# Patient Record
Sex: FEMALE | Race: WHITE | Employment: UNEMPLOYED | URBAN - METROPOLITAN AREA
[De-identification: names, ages, dates, MRNs, and addresses within clinical notes are randomized per-mention and may not be internally consistent; named-entity substitution may affect disease eponyms.]

---

## 2017-08-12 PROBLEM — L03.115 CELLULITIS OF RIGHT LEG WITHOUT FOOT: Status: ACTIVE | Noted: 2017-08-12

## 2017-08-12 PROBLEM — E87.1 HYPONATREMIA: Status: ACTIVE | Noted: 2017-08-12

## 2017-08-12 PROBLEM — B18.2 HEP C W/O COMA, CHRONIC (HCC): Status: ACTIVE | Noted: 2017-08-12

## 2017-08-12 PROBLEM — F11.20 HEROIN DEPENDENCE (HCC): Status: ACTIVE | Noted: 2017-08-12

## 2017-08-12 PROBLEM — F14.10 COCAINE ABUSE (HCC): Status: ACTIVE | Noted: 2017-08-12

## 2017-08-12 PROBLEM — E87.20 LACTIC ACIDOSIS: Status: ACTIVE | Noted: 2017-08-12

## 2017-08-12 PROBLEM — F19.10 POLYSUBSTANCE ABUSE (HCC): Status: ACTIVE | Noted: 2017-08-12

## 2017-08-12 PROBLEM — M00.9 SEPTIC ARTHRITIS OF KNEE, RIGHT (HCC): Status: ACTIVE | Noted: 2017-08-12

## 2017-08-15 PROBLEM — R59.1 LYMPHADENOPATHY: Status: ACTIVE | Noted: 2017-08-15

## 2017-08-15 PROBLEM — M00.211: Status: ACTIVE | Noted: 2017-08-15

## 2017-08-27 PROBLEM — R50.9 FEBRILE: Status: ACTIVE | Noted: 2017-08-27

## 2017-08-27 PROBLEM — M25.511 RIGHT SHOULDER PAIN: Status: ACTIVE | Noted: 2017-08-27

## 2017-08-27 PROBLEM — F17.200 TOBACCO USE DISORDER: Status: ACTIVE | Noted: 2017-08-27

## 2017-08-27 PROBLEM — L89.301 DECUBITUS ULCER OF BUTTOCK, STAGE 1: Status: ACTIVE | Noted: 2017-08-27

## 2017-08-27 PROBLEM — D64.9 ANEMIA: Status: ACTIVE | Noted: 2017-08-27

## 2017-08-27 PROBLEM — M25.561 RIGHT KNEE PAIN: Status: ACTIVE | Noted: 2017-08-27

## 2017-12-03 PROBLEM — R45.851 SUICIDAL IDEATIONS: Status: ACTIVE | Noted: 2017-12-03

## 2017-12-03 PROBLEM — F11.20 HEROIN DEPENDENCE (HCC): Chronic | Status: ACTIVE | Noted: 2017-12-03

## 2017-12-03 PROBLEM — R45.851 SUICIDAL IDEATION: Status: ACTIVE | Noted: 2017-12-03

## 2017-12-03 PROBLEM — R45.851 SUICIDAL IDEATIONS: Chronic | Status: ACTIVE | Noted: 2017-12-03

## 2017-12-03 PROBLEM — F39 MOOD DISORDER (HCC): Chronic | Status: ACTIVE | Noted: 2017-12-03

## 2017-12-03 PROBLEM — I10 ESSENTIAL HYPERTENSION: Chronic | Status: ACTIVE | Noted: 2017-12-03

## 2017-12-03 PROBLEM — F41.1 GENERALIZED ANXIETY DISORDER: Chronic | Status: ACTIVE | Noted: 2017-12-03

## 2017-12-03 PROBLEM — F19.10 POLYSUBSTANCE ABUSE (HCC): Chronic | Status: ACTIVE | Noted: 2017-12-03

## 2017-12-03 PROBLEM — F19.90 IVDU (INTRAVENOUS DRUG USER): Chronic | Status: ACTIVE | Noted: 2017-12-03

## 2017-12-03 PROBLEM — J45.20 MILD INTERMITTENT ASTHMA WITHOUT COMPLICATION: Chronic | Status: ACTIVE | Noted: 2017-12-03

## 2017-12-03 PROBLEM — F14.10 COCAINE ABUSE (HCC): Chronic | Status: ACTIVE | Noted: 2017-12-03

## 2017-12-03 PROBLEM — F32.A DEPRESSION: Status: ACTIVE | Noted: 2017-12-03

## 2017-12-03 PROBLEM — I33.0 CHRONIC BACTERIAL ENDOCARDITIS: Chronic | Status: ACTIVE | Noted: 2017-12-03

## 2017-12-03 PROBLEM — F43.12 CHRONIC POST-TRAUMATIC STRESS DISORDER (PTSD): Chronic | Status: ACTIVE | Noted: 2017-12-03

## 2017-12-03 PROBLEM — F39 MOOD DISORDER (HCC): Status: ACTIVE | Noted: 2017-12-03

## 2017-12-03 PROBLEM — B18.2 HEP C W/O COMA, CHRONIC (HCC): Chronic | Status: ACTIVE | Noted: 2017-12-03

## 2017-12-03 PROBLEM — F32.A DEPRESSION: Chronic | Status: ACTIVE | Noted: 2017-12-03

## 2017-12-03 PROBLEM — M25.561 RIGHT KNEE PAIN: Chronic | Status: ACTIVE | Noted: 2017-08-27

## 2017-12-03 PROBLEM — F19.20 POLYSUBSTANCE DEPENDENCE (HCC): Chronic | Status: ACTIVE | Noted: 2017-12-03

## 2017-12-03 PROBLEM — F60.3 BORDERLINE PERSONALITY DISORDER IN ADULT (HCC): Chronic | Status: ACTIVE | Noted: 2017-12-03

## 2018-09-23 PROBLEM — F60.3 BORDERLINE PERSONALITY DISORDER IN ADULT (HCC): Chronic | Status: RESOLVED | Noted: 2017-12-03 | Resolved: 2018-09-23

## 2018-09-23 PROBLEM — R50.9 FEBRILE: Status: RESOLVED | Noted: 2017-08-27 | Resolved: 2018-09-23

## 2018-09-23 PROBLEM — R59.1 LYMPHADENOPATHY: Status: RESOLVED | Noted: 2017-08-15 | Resolved: 2018-09-23

## 2018-09-23 PROBLEM — D64.9 NORMOCYTIC ANEMIA: Status: RESOLVED | Noted: 2017-08-27 | Resolved: 2018-09-23

## 2018-09-23 PROBLEM — D64.9 ANEMIA: Status: RESOLVED | Noted: 2017-08-27 | Resolved: 2018-09-23

## 2018-09-23 PROBLEM — M25.561 RIGHT KNEE PAIN: Chronic | Status: RESOLVED | Noted: 2017-08-27 | Resolved: 2018-09-23

## 2018-09-23 PROBLEM — L89.301 DECUBITUS ULCER OF BUTTOCK, STAGE 1: Status: RESOLVED | Noted: 2017-08-27 | Resolved: 2018-09-23

## 2018-09-23 PROBLEM — R45.851 SUICIDAL IDEATION: Status: RESOLVED | Noted: 2017-12-03 | Resolved: 2018-09-23

## 2018-09-23 PROBLEM — R45.851 SUICIDAL IDEATIONS: Chronic | Status: RESOLVED | Noted: 2017-12-03 | Resolved: 2018-09-23

## 2018-09-23 PROBLEM — M00.9 SEPTIC JOINT (HCC): Status: ACTIVE | Noted: 2018-09-23

## 2018-09-23 PROBLEM — M00.211: Status: RESOLVED | Noted: 2017-08-15 | Resolved: 2018-09-23

## 2018-09-23 PROBLEM — L03.115 CELLULITIS OF RIGHT LEG WITHOUT FOOT: Status: RESOLVED | Noted: 2017-08-12 | Resolved: 2018-09-23

## 2018-09-23 PROBLEM — I33.0 CHRONIC BACTERIAL ENDOCARDITIS: Chronic | Status: RESOLVED | Noted: 2017-12-03 | Resolved: 2018-09-23

## 2018-09-23 PROBLEM — E87.20 LACTIC ACIDOSIS: Status: RESOLVED | Noted: 2017-08-12 | Resolved: 2018-09-23

## 2018-09-23 PROBLEM — F39 MOOD DISORDER (HCC): Chronic | Status: RESOLVED | Noted: 2017-12-03 | Resolved: 2018-09-23

## 2018-09-23 PROBLEM — M00.9 PYOGENIC ARTHRITIS OF RIGHT KNEE JOINT (HCC): Status: RESOLVED | Noted: 2017-08-12 | Resolved: 2018-09-23

## 2018-09-23 PROBLEM — M00.9 SEPTIC JOINT OF LEFT KNEE JOINT (HCC): Status: ACTIVE | Noted: 2018-09-23

## 2018-09-23 PROBLEM — M25.511 RIGHT SHOULDER PAIN: Status: RESOLVED | Noted: 2017-08-27 | Resolved: 2018-09-23

## 2018-11-15 ENCOUNTER — HOSPITAL ENCOUNTER (EMERGENCY)
Age: 43
Discharge: HOME | End: 2018-11-15
Attending: EMERGENCY MEDICINE
Payer: SELF-PAY

## 2018-11-15 VITALS
OXYGEN SATURATION: 98 % | HEART RATE: 72 BPM | SYSTOLIC BLOOD PRESSURE: 86 MMHG | TEMPERATURE: 98.6 F | DIASTOLIC BLOOD PRESSURE: 61 MMHG | RESPIRATION RATE: 20 BRPM

## 2018-11-15 DIAGNOSIS — F32.89 OTHER DEPRESSION: Primary | ICD-10-CM

## 2018-11-15 LAB
ALBUMIN SERPL-MCNC: 3.6 G/DL (ref 3.5–5)
ALBUMIN/GLOB SERPL: 0.8 {RATIO} (ref 1.1–2.2)
ALP SERPL-CCNC: 96 U/L (ref 45–117)
ALT SERPL-CCNC: 216 U/L (ref 12–78)
AMPHET UR QL SCN: NEGATIVE
ANION GAP SERPL CALC-SCNC: 7 MMOL/L (ref 5–15)
APAP SERPL-MCNC: <2 UG/ML (ref 10–30)
APPEARANCE UR: ABNORMAL
AST SERPL-CCNC: 122 U/L (ref 15–37)
BACTERIA URNS QL MICRO: NEGATIVE /HPF
BARBITURATES UR QL SCN: NEGATIVE
BASOPHILS # BLD: 0.1 K/UL (ref 0–0.1)
BASOPHILS NFR BLD: 0 % (ref 0–1)
BENZODIAZ UR QL: NEGATIVE
BILIRUB SERPL-MCNC: 0.4 MG/DL (ref 0.2–1)
BILIRUB UR QL: NEGATIVE
BUN SERPL-MCNC: 14 MG/DL (ref 6–20)
BUN/CREAT SERPL: 23 (ref 12–20)
CALCIUM SERPL-MCNC: 9.3 MG/DL (ref 8.5–10.1)
CANNABINOIDS UR QL SCN: NEGATIVE
CHLORIDE SERPL-SCNC: 106 MMOL/L (ref 97–108)
CO2 SERPL-SCNC: 25 MMOL/L (ref 21–32)
COCAINE UR QL SCN: NEGATIVE
COLOR UR: ABNORMAL
CREAT SERPL-MCNC: 0.62 MG/DL (ref 0.55–1.02)
DIFFERENTIAL METHOD BLD: ABNORMAL
DRUG SCRN COMMENT,DRGCM: ABNORMAL
EOSINOPHIL # BLD: 0.2 K/UL (ref 0–0.4)
EOSINOPHIL NFR BLD: 1 % (ref 0–7)
EPITH CASTS URNS QL MICRO: ABNORMAL /LPF
ERYTHROCYTE [DISTWIDTH] IN BLOOD BY AUTOMATED COUNT: 12.4 % (ref 11.5–14.5)
ETHANOL SERPL-MCNC: <10 MG/DL
GLOBULIN SER CALC-MCNC: 4.3 G/DL (ref 2–4)
GLUCOSE SERPL-MCNC: 104 MG/DL (ref 65–100)
GLUCOSE UR STRIP.AUTO-MCNC: NEGATIVE MG/DL
HCT VFR BLD AUTO: 41.1 % (ref 35–47)
HGB BLD-MCNC: 13.6 G/DL (ref 11.5–16)
HGB UR QL STRIP: NEGATIVE
IMM GRANULOCYTES # BLD: 0.1 K/UL (ref 0–0.04)
IMM GRANULOCYTES NFR BLD AUTO: 1 % (ref 0–0.5)
KETONES UR QL STRIP.AUTO: NEGATIVE MG/DL
LEUKOCYTE ESTERASE UR QL STRIP.AUTO: ABNORMAL
LYMPHOCYTES # BLD: 2.4 K/UL (ref 0.8–3.5)
LYMPHOCYTES NFR BLD: 22 % (ref 12–49)
MCH RBC QN AUTO: 30 PG (ref 26–34)
MCHC RBC AUTO-ENTMCNC: 33.1 G/DL (ref 30–36.5)
MCV RBC AUTO: 90.5 FL (ref 80–99)
METHADONE UR QL: POSITIVE
MONOCYTES # BLD: 0.4 K/UL (ref 0–1)
MONOCYTES NFR BLD: 4 % (ref 5–13)
NEUTS SEG # BLD: 8.1 K/UL (ref 1.8–8)
NEUTS SEG NFR BLD: 72 % (ref 32–75)
NITRITE UR QL STRIP.AUTO: NEGATIVE
NRBC # BLD: 0 K/UL (ref 0–0.01)
NRBC BLD-RTO: 0 PER 100 WBC
OPIATES UR QL: NEGATIVE
PCP UR QL: NEGATIVE
PH UR STRIP: 6.5 [PH] (ref 5–8)
PLATELET # BLD AUTO: 182 K/UL (ref 150–400)
PMV BLD AUTO: 10.4 FL (ref 8.9–12.9)
POTASSIUM SERPL-SCNC: 4 MMOL/L (ref 3.5–5.1)
PROT SERPL-MCNC: 7.9 G/DL (ref 6.4–8.2)
PROT UR STRIP-MCNC: NEGATIVE MG/DL
RBC # BLD AUTO: 4.54 M/UL (ref 3.8–5.2)
RBC #/AREA URNS HPF: ABNORMAL /HPF (ref 0–5)
SALICYLATES SERPL-MCNC: 3.2 MG/DL (ref 2.8–20)
SODIUM SERPL-SCNC: 138 MMOL/L (ref 136–145)
SP GR UR REFRACTOMETRY: 1.03 (ref 1–1.03)
UR CULT HOLD, URHOLD: NORMAL
UROBILINOGEN UR QL STRIP.AUTO: 1 EU/DL (ref 0.2–1)
WBC # BLD AUTO: 11.2 K/UL (ref 3.6–11)
WBC URNS QL MICRO: ABNORMAL /HPF (ref 0–4)

## 2018-11-15 PROCEDURE — 81001 URINALYSIS AUTO W/SCOPE: CPT

## 2018-11-15 PROCEDURE — 99284 EMERGENCY DEPT VISIT MOD MDM: CPT

## 2018-11-15 PROCEDURE — 85025 COMPLETE CBC W/AUTO DIFF WBC: CPT

## 2018-11-15 PROCEDURE — 36415 COLL VENOUS BLD VENIPUNCTURE: CPT

## 2018-11-15 PROCEDURE — 80307 DRUG TEST PRSMV CHEM ANLYZR: CPT

## 2018-11-15 PROCEDURE — 80053 COMPREHEN METABOLIC PANEL: CPT

## 2018-11-15 PROCEDURE — 90791 PSYCH DIAGNOSTIC EVALUATION: CPT

## 2018-11-15 NOTE — ED PROVIDER NOTES
44 y.o. female with past medical history significant for substance abuse, HTN, PCOS, pituitary tumor, bipolar disorder, PTSD, anxiety, and septic joint who presents from home via private vehicle with chief complaint of mental health problem. Pt reports a h/o bipolar, depression, anxiety, and PTSD, along with a h/o heroin abuse and is on methadone. Pt states she was in a nursing home after an admission recently for a septic joint. The patient's cousin states she was given 2 weeks worth of her medications, including methadone. Cousin states during this time, they have been trying to get the patient the correct documents to be set up at a methadone clinic here. The patient has been out of these medications for the last 4 days, and has been experiencing decreased appetite and generalized body aches. The patient states her ID will not come in the mail for another 7-10 days. Pt states she is worried about harming herself to \"take the easy way out\" if she does not get help. Pt reports a h/o suicidal attempts. Pt reports a h/o hepatitis C. There are no other acute medical concerns at this time. Old Chart Review: The patient was admitted to behavioral health in December 2017 for depression and an accidental drug overdose. Pt was seen again in December for an NSTEMI. Pt was admitted in September 2018 for right knee septic arthritis. Social hx: Current smoker; Current EtOH use; H/O heroin and cocaine abuse Note written by Tash Fowler, as dictated by Bonifacio Lake MD 12:58 PM 
 
 
The history is provided by the patient and medical records. No  was used. Past Medical History:  
Diagnosis Date  Anxiety disorder  Arthritis, septic, knee ([facility] 08/2017  Bipolar disorder ([facility]  Cancer ([facility] 06/2017 polycystic ovarian cancer  Depression  Diabetes ([facility] Resolved after weight loss. Last a1c 4.8 in 2016  Hypertension  Ill-defined condition cerebellum and pituitary tumor  Liver disease Hepatitis C  
 Pituitary tumor  Polycystic ovarian syndrome  Psychiatric disorder   
 depression  Psychiatric disorder   
 bipolar  Psychiatric disorder PTSD  Psychiatric disorder   
 anxiety  Psychiatric disorder  Substance abuse ([facility]  Suicidal thoughts Past Surgical History:  
Procedure Laterality Date  HX DILATION AND CURETTAGE    
 HX TONSILLECTOMY Family History:  
Family history unknown: Yes  
 
 
Social History Socioeconomic History  Marital status: SINGLE Spouse name: Not on file  Number of children: Not on file  Years of education: Not on file  Highest education level: Not on file Social Needs  Financial resource strain: Not on file  Food insecurity - worry: Not on file  Food insecurity - inability: Not on file  Transportation needs - medical: Not on file  Transportation needs - non-medical: Not on file Occupational History  Not on file Tobacco Use  Smoking status: Current Every Day Smoker Packs/day: 0.25 Years: 20.00 Pack years: 5.00 Types: Cigarettes  Smokeless tobacco: Current User Substance and Sexual Activity  Alcohol use: Yes Alcohol/week: 0.0 oz  
  Comment: last drink in July 2017  Drug use: Yes Types: Heroin, Cocaine Comment: last use today  Sexual activity: Yes  
  Partners: Male Birth control/protection: None Other Topics Concern  Not on file Social History Narrative  Not on file ALLERGIES: Penicillins and Bee sting [sting, bee] Review of Systems Constitutional: Positive for appetite change. Negative for activity change and fatigue. HENT: Negative for ear pain, facial swelling, sore throat and trouble swallowing. Eyes: Negative for pain, discharge and visual disturbance. Respiratory: Negative for chest tightness, shortness of breath and wheezing. Cardiovascular: Negative for chest pain and palpitations. Gastrointestinal: Negative for abdominal pain, blood in stool, nausea and vomiting. Genitourinary: Negative for difficulty urinating, flank pain and hematuria. Musculoskeletal: Positive for myalgias. Negative for arthralgias, joint swelling and neck pain. Skin: Negative for color change and rash. Neurological: Negative for dizziness, weakness, numbness and headaches. Hematological: Negative for adenopathy. Does not bruise/bleed easily. Psychiatric/Behavioral: Positive for dysphoric mood and suicidal ideas. Negative for behavioral problems, confusion and sleep disturbance. All other systems reviewed and are negative. Vitals:  
 11/15/18 1140 Pulse: (!) 106 SpO2: 100% Physical Exam  
Constitutional: She is oriented to person, place, and time. She appears well-developed and well-nourished. No distress. Has a generalized twitch. HENT:  
Head: Normocephalic and atraumatic. Nose: Nose normal.  
Mouth/Throat: Oropharynx is clear and moist.  
Eyes: Conjunctivae and EOM are normal. Pupils are equal, round, and reactive to light. No scleral icterus. Neck: Normal range of motion. Neck supple. No JVD present. No tracheal deviation present. No thyromegaly present. No carotid bruits noted. Cardiovascular: Normal rate, regular rhythm, normal heart sounds and intact distal pulses. Exam reveals no gallop and no friction rub. No murmur heard. Pulmonary/Chest: Effort normal and breath sounds normal. No respiratory distress. She has no wheezes. She has no rales. She exhibits no tenderness. Abdominal: Soft. Bowel sounds are normal. She exhibits no distension and no mass. There is no tenderness. There is no rebound and no guarding. Musculoskeletal: Normal range of motion. She exhibits no edema or tenderness. Lymphadenopathy:  
  She has no cervical adenopathy. Neurological: She is alert and oriented to person, place, and time. She has normal reflexes. No cranial nerve deficit. Coordination normal.  
Skin: Skin is warm and dry. No rash noted. No erythema. Psychiatric: She has a normal mood and affect. Her behavior is normal. Judgment and thought content normal.  
Nursing note and vitals reviewed. Note written by Tash West, as dictated by Thierry Daniels MD 12:58 PM 
 
MDM Procedures 2:41 PM 
Spoke with Sandeep Macario from [facility], states she has yet to speak with the attending, but suspects there will not be much to do for her and will likely be discharged.

## 2018-11-15 NOTE — ED NOTES
Reviewed discharge instructions with the patient and female  present, each verbalized understanding and denied having any further questions. Patient stated, \"My blood pressure is always really low, usually under 100. \" Denied dizziness, able to ambulate at this time.

## 2018-11-15 NOTE — DISCHARGE INSTRUCTIONS
Recovering From Depression: Care Instructions  Your Care Instructions    Taking good care of yourself is important as you recover from depression. In time, your symptoms will fade as your treatment takes hold. Do not give up. Instead, focus your energy on getting better. Your mood will improve. It just takes some time. Focus on things that can help you feel better, such as being with friends and family, eating well, and getting enough rest. But take things slowly. Do not do too much too soon. You will begin to feel better gradually. Follow-up care is a key part of your treatment and safety. Be sure to make and go to all appointments, and call your doctor if you are having problems. It's also a good idea to know your test results and keep a list of the medicines you take. How can you care for yourself at home? Be realistic  · If you have a large task to do, break it up into smaller steps you can handle, and just do what you can. · You may want to put off important decisions until your depression has lifted. If you have plans that will have a major impact on your life, such as marriage, divorce, or a job change, try to wait a bit. Talk it over with friends and loved ones who can help you look at the overall picture first.  · Reaching out to people for help is important. Do not isolate yourself. Let your family and friends help you. Find someone you can trust and confide in, and talk to that person. · Be patient, and be kind to yourself. Remember that depression is not your fault and is not something you can overcome with willpower alone. Treatment is necessary for depression, just like for any other illness. Feeling better takes time, and your mood will improve little by little. Stay active  · Stay busy and get outside. Take a walk, or try some other light exercise. · Talk with your doctor about an exercise program. Exercise can help with mild depression. · Go to a movie or concert.  Take part in a [employer] activity or other social gathering. Go to a [employer] game. · Ask a friend to have dinner with you. Take care of yourself  · Eat a balanced diet with plenty of fresh fruits and vegetables, whole grains, and lean protein. If you have lost your appetite, eat small snacks rather than large meals. · Avoid drinking alcohol or using illegal drugs. Do not take medicines that have not been prescribed for you. They may interfere with medicines you may be taking for depression, or they may make your depression worse. · Take your medicines exactly as they are prescribed. You may start to feel better within 1 to 3 weeks of taking antidepressant medicine. But it can take as many as 6 to 8 weeks to see more improvement. If you have questions or concerns about your medicines, or if you do not notice any improvement by 3 weeks, talk to your doctor. · If you have any side effects from your medicine, tell your doctor. Antidepressants can make you feel tired, dizzy, or nervous. Some people have dry mouth, constipation, headaches, sexual problems, or diarrhea. Many of these side effects are mild and will go away on their own after you have been taking the medicine for a few weeks. Some may last longer. Talk to your doctor if side effects are bothering you too much. You might be able to try a different medicine. · Get enough sleep. If you have problems sleeping:  ? Go to bed at the same time every night, and get up at the same time every morning. ? Keep your bedroom dark and quiet. ? Do not exercise after 5:00 p.m.  ? Avoid drinks with caffeine after 5:00 p.m. · Avoid sleeping pills unless they are prescribed by the doctor treating your depression. Sleeping pills may make you groggy during the day, and they may interact with other medicine you are taking. · If you have any other illnesses, such as diabetes, heart disease, or high blood pressure, make sure to continue with your treatment.  Tell your doctor about all of the medicines you take, including those with or without a prescription. · Keep the numbers for these national suicide hotlines: 4-416-864-TALK (3-924.634.3722) and 1-985-FIFQBED (0-182.666.9480). If you or someone you know talks about suicide or feeling hopeless, get help right away. When should you call for help? Call 911 anytime you think you may need emergency care. For example, call if:    · You feel like hurting yourself or someone else.     · Someone you know has depression and is about to attempt or is attempting suicide.   [facility] your doctor now or seek immediate medical care if:    · You hear voices.     · Someone you know has depression and:  ? Starts to give away his or her possessions. ? Uses illegal drugs or drinks alcohol heavily. ? Talks or writes about death, including writing suicide notes or talking about guns, knives, or pills. ? Starts to spend a lot of time alone. ? Acts very aggressively or suddenly appears calm.    Watch closely for changes in your health, and be sure to contact your doctor if:    · You do not get better as expected. Where can you learn more? Go to http://jeffrey-shiela.info/. Enter A935 in the search box to learn more about \"Recovering From Depression: Care Instructions. \"  Current as of: December 7, 2017  Content Version: 11.8  © 7491-0118 Healthwise, Incorporated. Care instructions adapted under license by [employer] (which disclaims liability or warranty for this information). If you have questions about a medical condition or this instruction, always ask your healthcare professional. [place] [address] any warranty or liability for your use of this information. We hope that we have addressed all of your medical concerns. The examination and treatment you received in the Emergency Department were for an emergent problem and were not intended as complete care.  It is important that you follow up with your healthcare provider(s) for ongoing care. If your symptoms worsen or do not improve as expected, and you are unable to reach your usual health care provider(s), you should return to the Emergency Department. Today's healthcare is undergoing tremendous change, and patient satisfaction surveys are one of the many tools to assess the quality of medical care. You may receive a survey from the [employer] organization regarding your experience in the Emergency Department. I hope that your experience has been completely positive, particularly the medical care that I provided. As such, please participate in the survey; anything less than excellent does not meet my expectations or intentions. [address] [facility] and [address] [place] [address] participate in nationally recognized quality of care measures. If your blood pressure is greater than 120/80, as reported below, we urge that you seek medical care to address the potential of high blood pressure, commonly known as hypertension. Hypertension can be hereditary or can be caused by certain medical conditions, pain, stress, or \"white coat syndrome. \"       Please make an appointment with your health care provider(s) for follow up of your Emergency Department visit. VITALS:   Patient Vitals for the past 8 hrs:   Pulse SpO2   11/15/18 1140 (!) 106 100 %          Thank you for allowing us to provide you with medical care today. We realize that you have many choices for your emergency care needs. Please choose us in the future for any continued health care needs. Joon Caputo, [address]: [phone]            Recent Results (from the past 24 hour(s))   METABOLIC PANEL, COMPREHENSIVE    Collection Time: 11/15/18  1:40 PM   Result Value Ref Range    Sodium 138 136 - 145 mmol/L    Potassium 4.0 3.5 - 5.1 mmol/L    Chloride 106 97 - 108 mmol/L    CO2 25 21 - 32 mmol/L    Anion gap 7 5 - 15 mmol/L    Glucose 104 (H) 65 - 100 mg/dL    BUN 14 6 - 20 MG/DL    Creatinine 0.62 0.55 - 1.02 MG/DL    BUN/Creatinine ratio 23 (H) 12 - 20      GFR est AA >60 >60 ml/min/1.73m2    GFR est non-AA >60 >60 ml/min/1.73m2    Calcium 9.3 8.5 - 10.1 MG/DL    Bilirubin, total 0.4 0.2 - 1.0 MG/DL    ALT (SGPT) 216 (H) 12 - 78 U/L    AST (SGOT) 122 (H) 15 - 37 U/L    Alk.  phosphatase 96 45 - 117 U/L    Protein, total 7.9 6.4 - 8.2 g/dL    Albumin 3.6 3.5 - 5.0 g/dL    Globulin 4.3 (H) 2.0 - 4.0 g/dL    A-G Ratio 0.8 (L) 1.1 - 2.2     ETHYL ALCOHOL    Collection Time: 11/15/18  1:40 PM   Result Value Ref Range    ALCOHOL(ETHYL),SERUM <10 <10 MG/DL   ACETAMINOPHEN    Collection Time: 11/15/18  1:40 PM   Result Value Ref Range    Acetaminophen level <2 (L) 10 - 30 ug/mL   SALICYLATE    Collection Time: 11/15/18  1:40 PM   Result Value Ref Range    Salicylate level 3.2 2.8 - 20.0 MG/DL   URINALYSIS W/ RFLX MICROSCOPIC    Collection Time: 11/15/18  2:22 PM   Result Value Ref Range    Color YELLOW/STRAW      Appearance CLOUDY (A) CLEAR      Specific gravity 1.026 1.003 - 1.030      pH (UA) 6.5 5.0 - 8.0      Protein NEGATIVE  NEG mg/dL    Glucose NEGATIVE  NEG mg/dL    Ketone NEGATIVE  NEG mg/dL    Bilirubin NEGATIVE  NEG      Blood NEGATIVE  NEG      Urobilinogen 1.0 0.2 - 1.0 EU/dL    Nitrites NEGATIVE  NEG      Leukocyte Esterase SMALL (A) NEG      WBC 5-10 0 - 4 /hpf    RBC 5-10 0 - 5 /hpf    Epithelial cells MANY (A) FEW /lpf    Bacteria NEGATIVE  NEG /hpf   DRUG SCREEN, URINE    Collection Time: 11/15/18  2:22 PM   Result Value Ref Range    AMPHETAMINES NEGATIVE  NEG      BARBITURATES NEGATIVE  NEG      BENZODIAZEPINES NEGATIVE  NEG      COCAINE NEGATIVE  NEG      METHADONE POSITIVE (A) NEG      OPIATES NEGATIVE  NEG      PCP(PHENCYCLIDINE) NEGATIVE  NEG      THC (TH-CANNABINOL) NEGATIVE  NEG      Drug screen comment (NOTE)    URINE CULTURE HOLD SAMPLE    Collection Time: 11/15/18  2:22 PM   Result Value Ref Range Urine culture hold        URINE ON HOLD IN MICROBIOLOGY DEPT FOR 3 DAYS. IF UNPRESERVED URINE IS SUBMITTED, IT CANNOT BE USED FOR ADDITIONAL TESTING AFTER 24 HRS, RECOLLECTION WILL BE REQUIRED. CBC WITH AUTOMATED DIFF    Collection Time: 11/15/18  2:30 PM   Result Value Ref Range    WBC 11.2 (H) 3.6 - 11.0 K/uL    RBC 4.54 3.80 - 5.20 M/uL    HGB 13.6 11.5 - 16.0 g/dL    HCT 41.1 35.0 - 47.0 %    MCV 90.5 80.0 - 99.0 FL    MCH 30.0 26.0 - 34.0 PG    MCHC 33.1 30.0 - 36.5 g/dL    RDW 12.4 11.5 - 14.5 %    PLATELET 439 584 - 471 K/uL    MPV 10.4 8.9 - 12.9 FL    NRBC 0.0 0  WBC    ABSOLUTE NRBC 0.00 0.00 - 0.01 K/uL    NEUTROPHILS 72 32 - 75 %    LYMPHOCYTES 22 12 - 49 %    MONOCYTES 4 (L) 5 - 13 %    EOSINOPHILS 1 0 - 7 %    BASOPHILS 0 0 - 1 %    IMMATURE GRANULOCYTES 1 (H) 0.0 - 0.5 %    ABS. NEUTROPHILS 8.1 (H) 1.8 - 8.0 K/UL    ABS. LYMPHOCYTES 2.4 0.8 - 3.5 K/UL    ABS. MONOCYTES 0.4 0.0 - 1.0 K/UL    ABS. EOSINOPHILS 0.2 0.0 - 0.4 K/UL    ABS. BASOPHILS 0.1 0.0 - 0.1 K/UL    ABS. IMM. GRANS. 0.1 (H) 0.00 - 0.04 K/UL    DF AUTOMATED         No results found.

## 2018-11-15 NOTE — ED NOTES
PROGRESS NOTE: 
3:54 PM 
Pt refused admission. Is not suicidal at this time. Will discharge with close f/u by Psychiatry.   
Note written by Tash Dick, as dictated by Celeste Hong MD 3:54 PM

## 2018-11-16 NOTE — BSMART NOTE
Comprehensive Assessment Form Part 1 Section I - Disposition Axis I - Bipolar Disorder, Opiate Use Disorder Axis II - Deferred Axis III - Past Medical History:  
Diagnosis Date  Anxiety disorder  Arthritis, septic, knee ([facility] 08/2017  [facility]  Cancer ([facility] 06/2017 polycystic ovarian cancer  Depression  Diabetes ([facility] Resolved after weight loss. Last a1c 4.8 in 2016  Hypertension  Ill-defined condition   
 cerebellum and pituitary tumor  Liver disease Hepatitis C  
 Pituitary tumor  Polycystic ovarian syndrome  Psychiatric disorder   
 depression  Psychiatric disorder   
 bipolar  Psychiatric disorder PTSD  Psychiatric disorder   
 anxiety  Psychiatric disorder  Substance abuse ([facility]  Suicidal thoughts Axis IV - SA, access to medical care [place] V - 45 The Medical Doctor to Psychiatrist conference was not completed. The Medical Doctor is in agreement with Psychiatrist disposition because of (reason) Admission offered but declined. Nicolas Diego The plan is discharge to family/cousin and follow up at [facility] and [facility]. The on-call Psychiatrist consulted was Dr. Zhou Eric. The admitting Psychiatrist will be Dr. Seema Ibarra. The admitting Diagnosis is NA. The Payor source is self pay. Section II - Integrated Summary Summary:  Patient came in accompanied by her cousin due to pain and withdrawal.  Patient reportedly just released from rehab for a septic join and given 2 weeks of methadone. She reportedly ran out a couple of days ago and since she was previously homeless and doesn't have an ID. She has been unable to follow up at [facility] until her picture ID is mailed to her in about 7-10 days. Patient has also been diagnosed with Bipolar per record and is on Zoloft and Trazedone. Patient reported she wanted to get back on Latuda as well.   Patient reported she currently sees Ale Puente at [address] [place] [address] for goup and [facility]  at [facility] for individual.  Patient will see a psychiatrist there as well. Patient has had previous admissions and the most recent one was in December. Patient is alert and oriented. Speech is clear and coherent. Mood is anxious and affect constricted. Patient denied any hallucinations at this time. She further denied any homicidal ideation. She is not actively suicidal but is afraid that if she left without medication that she might \"take the easy way out. \"  Patient reported several past overdose attempts. Patient was offered admission at [facility] and declined stating she wanted to go home and be with her mother. Cousin indicated that she and the other family would watch her and she has follow up arranged. The patienthas demonstrated mental capacity to provide informed consent. The information is given by the patient and relative(s). The Chief Complaint is withdrawal and depression. The Precipitant Factors are lack of medication, previous SA, lack of ability to follow up unitl ID comes. [place] Previous Hospitalizations: yes  is [facility]. Lethality Assessment: 
 
The potential for suicide noted by the following: {hx of overdoses and patient stated if she didn't get medication she might be suicidal but not actively and no plan verbalized. .  The potential for homicide is not noted. The patient has not been a perpetrator of sexual or physical abuse. There are not pending charges. The patient is not felt to be at risk for self harm or harm to others. The attending nurse was advised that security has not been notified. Section III - Psychosocial 
The patient's overall mood and attitude is anxious. Feelings of helplessness and hopelessness are not observed. Generalized anxiety is not observed. Panic is not observed. Phobias are not observed. Obsessive compulsive tendencies are not observed.    
 
Section IV - Mental Status Exam 
 The patient's appearance shows no evidence of impairment. The patient's behavior is restless. The patient is oriented to time, place, person and situation. The patient's speech shows no evidence of impairment. The patient's mood is anxious. The range of affect is constricted. The patient's thought content demonstrates no evidence of impairment. The thought process shows no evidence of impairment. The patient's perception shows no evidence of impairment. The patient's memory shows no evidence of impairment. The patient's appetite is decreased and shows signs of weight loss. The patient's sleep has evidence of insomnia. The patient shows no insight. The patient's judgement is psychologically impaired. Section V - Substance Abuse The patient is not using substances. Patient has used heroin in the past but denied current use. Section VI - Living Arrangements The patient is single. The patient lives with a family. The patient has one child. The patient does plan to return home upon discharge. The patient does not have legal issues pending. The patient's source of income comes from family. Christian and cultural practices have not been voiced at this time. The patient's greatest support comes from family and this person will be involved with the treatment. The patient has not been in an event described as horrible or outside the realm of ordinary life experience either currently or in the past. 
The patient has not been a victim of sexual/physical abuse. Section VII - Other Areas of Clinical Concern The highest grade achieved is NA with the overall quality of school experience being described as NA. The patient is currently unemployed and speaks Claudette Serge as a primary language. The patient has no communication impairments affecting communication. The patient's preference for learning can be described as: can read and write adequately.   The patient's hearing is normal.  The patient's vision is normal. 
 
 
Sina Nielsen LPC

## 2020-08-06 ENCOUNTER — HOSPITAL ENCOUNTER (EMERGENCY)
Age: 45
Discharge: LEFT BEFORE DISCHARGE | End: 2020-08-06
Attending: EMERGENCY MEDICINE

## 2020-08-06 VITALS
HEART RATE: 111 BPM | SYSTOLIC BLOOD PRESSURE: 119 MMHG | DIASTOLIC BLOOD PRESSURE: 77 MMHG | BODY MASS INDEX: 34.19 KG/M2 | OXYGEN SATURATION: 96 % | RESPIRATION RATE: 20 BRPM | HEIGHT: 60 IN | TEMPERATURE: 98.4 F | WEIGHT: 174.16 LBS

## 2020-08-06 PROCEDURE — 75810000275 HC EMERGENCY DEPT VISIT NO LEVEL OF CARE

## 2020-08-06 NOTE — ED NOTES
Patient was given false information that we have detox unit. Patient and daughter decided to leave and not be seen.  Patient was not suicidal or homicidal.

## 2020-08-06 NOTE — ED TRIAGE NOTES
Triage Note: Patient is coming in for detox from heroin and cocaine.  Patient last used heroin this morning and cocaine 1 hour prior to arrival.

## 2021-10-26 ENCOUNTER — APPOINTMENT (OUTPATIENT)
Dept: GENERAL RADIOLOGY | Age: 46
End: 2021-10-26
Attending: EMERGENCY MEDICINE
Payer: MEDICAID

## 2021-10-26 ENCOUNTER — HOSPITAL ENCOUNTER (EMERGENCY)
Age: 46
Discharge: HOME | End: 2021-10-26
Attending: EMERGENCY MEDICINE
Payer: MEDICAID

## 2021-10-26 VITALS
WEIGHT: 104.94 LBS | RESPIRATION RATE: 18 BRPM | DIASTOLIC BLOOD PRESSURE: 86 MMHG | OXYGEN SATURATION: 99 % | TEMPERATURE: 97.7 F | HEART RATE: 80 BPM | HEIGHT: 60 IN | SYSTOLIC BLOOD PRESSURE: 145 MMHG | BODY MASS INDEX: 20.6 KG/M2

## 2021-10-26 DIAGNOSIS — J20.9 ACUTE BRONCHITIS, UNSPECIFIED ORGANISM: ICD-10-CM

## 2021-10-26 DIAGNOSIS — F19.10 SUBSTANCE ABUSE (HCC): ICD-10-CM

## 2021-10-26 DIAGNOSIS — N39.0 URINARY TRACT INFECTION WITHOUT HEMATURIA, SITE UNSPECIFIED: Primary | ICD-10-CM

## 2021-10-26 DIAGNOSIS — R51.9 NONINTRACTABLE HEADACHE, UNSPECIFIED CHRONICITY PATTERN, UNSPECIFIED HEADACHE TYPE: ICD-10-CM

## 2021-10-26 LAB
ALBUMIN SERPL-MCNC: 3 G/DL (ref 3.5–5)
ALBUMIN/GLOB SERPL: 0.7 {RATIO} (ref 1.1–2.2)
ALP SERPL-CCNC: 82 U/L (ref 45–117)
ALT SERPL-CCNC: 15 U/L (ref 12–78)
AMPHET UR QL SCN: NEGATIVE
ANION GAP SERPL CALC-SCNC: 5 MMOL/L (ref 5–15)
APPEARANCE UR: ABNORMAL
AST SERPL-CCNC: 14 U/L (ref 15–37)
BACTERIA URNS QL MICRO: ABNORMAL /HPF
BARBITURATES UR QL SCN: NEGATIVE
BASOPHILS # BLD: 0.1 K/UL (ref 0–0.1)
BASOPHILS NFR BLD: 0 % (ref 0–1)
BENZODIAZ UR QL: NEGATIVE
BILIRUB SERPL-MCNC: 0.4 MG/DL (ref 0.2–1)
BILIRUB UR QL: NEGATIVE
BUN SERPL-MCNC: 6 MG/DL (ref 6–20)
BUN/CREAT SERPL: 8 (ref 12–20)
CALCIUM SERPL-MCNC: 9.5 MG/DL (ref 8.5–10.1)
CANNABINOIDS UR QL SCN: NEGATIVE
CHLORIDE SERPL-SCNC: 104 MMOL/L (ref 97–108)
CO2 SERPL-SCNC: 27 MMOL/L (ref 21–32)
COCAINE UR QL SCN: POSITIVE
COLOR UR: ABNORMAL
COMMENT, HOLDF: NORMAL
CREAT SERPL-MCNC: 0.78 MG/DL (ref 0.55–1.02)
DIFFERENTIAL METHOD BLD: ABNORMAL
DRUG SCRN COMMENT,DRGCM: ABNORMAL
EOSINOPHIL # BLD: 0.1 K/UL (ref 0–0.4)
EOSINOPHIL NFR BLD: 1 % (ref 0–7)
EPITH CASTS URNS QL MICRO: ABNORMAL /LPF
ERYTHROCYTE [DISTWIDTH] IN BLOOD BY AUTOMATED COUNT: 12.5 % (ref 11.5–14.5)
GLOBULIN SER CALC-MCNC: 4.6 G/DL (ref 2–4)
GLUCOSE SERPL-MCNC: 156 MG/DL (ref 65–100)
GLUCOSE UR STRIP.AUTO-MCNC: NEGATIVE MG/DL
HCG UR QL: NEGATIVE
HCT VFR BLD AUTO: 39.5 % (ref 35–47)
HGB BLD-MCNC: 13.1 G/DL (ref 11.5–16)
HGB UR QL STRIP: ABNORMAL
IMM GRANULOCYTES # BLD AUTO: 0 K/UL (ref 0–0.04)
IMM GRANULOCYTES NFR BLD AUTO: 0 % (ref 0–0.5)
KETONES UR QL STRIP.AUTO: ABNORMAL MG/DL
LEUKOCYTE ESTERASE UR QL STRIP.AUTO: ABNORMAL
LYMPHOCYTES # BLD: 1.9 K/UL (ref 0.8–3.5)
LYMPHOCYTES NFR BLD: 17 % (ref 12–49)
MCH RBC QN AUTO: 30 PG (ref 26–34)
MCHC RBC AUTO-ENTMCNC: 33.2 G/DL (ref 30–36.5)
MCV RBC AUTO: 90.4 FL (ref 80–99)
METHADONE UR QL: NEGATIVE
MONOCYTES # BLD: 0.6 K/UL (ref 0–1)
MONOCYTES NFR BLD: 6 % (ref 5–13)
NEUTS SEG # BLD: 8.7 K/UL (ref 1.8–8)
NEUTS SEG NFR BLD: 76 % (ref 32–75)
NITRITE UR QL STRIP.AUTO: POSITIVE
NRBC # BLD: 0 K/UL (ref 0–0.01)
NRBC BLD-RTO: 0 PER 100 WBC
OPIATES UR QL: POSITIVE
PCP UR QL: NEGATIVE
PH UR STRIP: 5.5 [PH] (ref 5–8)
PLATELET # BLD AUTO: 312 K/UL (ref 150–400)
PMV BLD AUTO: 9.9 FL (ref 8.9–12.9)
POTASSIUM SERPL-SCNC: 3.4 MMOL/L (ref 3.5–5.1)
PROT SERPL-MCNC: 7.6 G/DL (ref 6.4–8.2)
PROT UR STRIP-MCNC: NEGATIVE MG/DL
RBC # BLD AUTO: 4.37 M/UL (ref 3.8–5.2)
RBC #/AREA URNS HPF: ABNORMAL /HPF (ref 0–5)
SAMPLES BEING HELD,HOLD: NORMAL
SARS-COV-2, COV2: NORMAL
SODIUM SERPL-SCNC: 136 MMOL/L (ref 136–145)
SP GR UR REFRACTOMETRY: 1.01 (ref 1–1.03)
UA: UC IF INDICATED,UAUC: ABNORMAL
UROBILINOGEN UR QL STRIP.AUTO: 0.2 EU/DL (ref 0.2–1)
WBC # BLD AUTO: 11.4 K/UL (ref 3.6–11)
WBC URNS QL MICRO: ABNORMAL /HPF (ref 0–4)

## 2021-10-26 PROCEDURE — 81025 URINE PREGNANCY TEST: CPT

## 2021-10-26 PROCEDURE — 87077 CULTURE AEROBIC IDENTIFY: CPT

## 2021-10-26 PROCEDURE — 74011250637 HC RX REV CODE- 250/637: Performed by: EMERGENCY MEDICINE

## 2021-10-26 PROCEDURE — 99284 EMERGENCY DEPT VISIT MOD MDM: CPT

## 2021-10-26 PROCEDURE — 87186 SC STD MICRODIL/AGAR DIL: CPT

## 2021-10-26 PROCEDURE — 81001 URINALYSIS AUTO W/SCOPE: CPT

## 2021-10-26 PROCEDURE — 74011250636 HC RX REV CODE- 250/636: Performed by: EMERGENCY MEDICINE

## 2021-10-26 PROCEDURE — 74011000258 HC RX REV CODE- 258: Performed by: EMERGENCY MEDICINE

## 2021-10-26 PROCEDURE — 87086 URINE CULTURE/COLONY COUNT: CPT

## 2021-10-26 PROCEDURE — 80307 DRUG TEST PRSMV CHEM ANLYZR: CPT

## 2021-10-26 PROCEDURE — U0005 INFEC AGEN DETEC AMPLI PROBE: HCPCS

## 2021-10-26 PROCEDURE — 96365 THER/PROPH/DIAG IV INF INIT: CPT

## 2021-10-26 PROCEDURE — 94640 AIRWAY INHALATION TREATMENT: CPT

## 2021-10-26 PROCEDURE — 96375 TX/PRO/DX INJ NEW DRUG ADDON: CPT

## 2021-10-26 PROCEDURE — 74011000250 HC RX REV CODE- 250: Performed by: EMERGENCY MEDICINE

## 2021-10-26 PROCEDURE — 80053 COMPREHEN METABOLIC PANEL: CPT

## 2021-10-26 PROCEDURE — 85025 COMPLETE CBC W/AUTO DIFF WBC: CPT

## 2021-10-26 PROCEDURE — 71045 X-RAY EXAM CHEST 1 VIEW: CPT

## 2021-10-26 RX ORDER — CEPHALEXIN 500 MG/1
500 CAPSULE ORAL 3 TIMES DAILY
Qty: 21 CAPSULE | Refills: 0 | Status: SHIPPED | OUTPATIENT
Start: 2021-10-26 | End: 2021-11-02

## 2021-10-26 RX ORDER — BUTALBITAL, ACETAMINOPHEN AND CAFFEINE 300; 40; 50 MG/1; MG/1; MG/1
1 CAPSULE ORAL
Qty: 12 CAPSULE | Refills: 0 | Status: SHIPPED | OUTPATIENT
Start: 2021-10-26

## 2021-10-26 RX ORDER — BUTALBITAL, ACETAMINOPHEN AND CAFFEINE 50; 325; 40 MG/1; MG/1; MG/1
2 TABLET ORAL
Status: COMPLETED | OUTPATIENT
Start: 2021-10-26 | End: 2021-10-26

## 2021-10-26 RX ORDER — KETOROLAC TROMETHAMINE 30 MG/ML
15 INJECTION, SOLUTION INTRAMUSCULAR; INTRAVENOUS
Status: COMPLETED | OUTPATIENT
Start: 2021-10-26 | End: 2021-10-26

## 2021-10-26 RX ORDER — CEPHALEXIN 500 MG/1
500 CAPSULE ORAL 3 TIMES DAILY
Qty: 21 CAPSULE | Refills: 0 | Status: SHIPPED | OUTPATIENT
Start: 2021-10-26 | End: 2021-10-26 | Stop reason: SDUPTHER

## 2021-10-26 RX ORDER — ALBUTEROL SULFATE 90 UG/1
2 AEROSOL, METERED RESPIRATORY (INHALATION)
Qty: 1 EACH | Refills: 0 | Status: SHIPPED | OUTPATIENT
Start: 2021-10-26

## 2021-10-26 RX ORDER — ALBUTEROL SULFATE 0.83 MG/ML
5 SOLUTION RESPIRATORY (INHALATION)
Status: DISCONTINUED | OUTPATIENT
Start: 2021-10-26 | End: 2021-10-26 | Stop reason: HOSPADM

## 2021-10-26 RX ORDER — BUTALBITAL, ACETAMINOPHEN AND CAFFEINE 300; 40; 50 MG/1; MG/1; MG/1
1 CAPSULE ORAL
Qty: 12 CAPSULE | Refills: 0 | Status: SHIPPED | OUTPATIENT
Start: 2021-10-26 | End: 2021-10-26 | Stop reason: SDUPTHER

## 2021-10-26 RX ORDER — ALBUTEROL SULFATE 90 UG/1
2 AEROSOL, METERED RESPIRATORY (INHALATION)
Qty: 1 EACH | Refills: 0 | Status: SHIPPED | OUTPATIENT
Start: 2021-10-26 | End: 2021-10-26 | Stop reason: SDUPTHER

## 2021-10-26 RX ORDER — IPRATROPIUM BROMIDE AND ALBUTEROL SULFATE 2.5; .5 MG/3ML; MG/3ML
3 SOLUTION RESPIRATORY (INHALATION) ONCE
Status: COMPLETED | OUTPATIENT
Start: 2021-10-26 | End: 2021-10-26

## 2021-10-26 RX ADMIN — SODIUM CHLORIDE 1000 ML: 9 INJECTION, SOLUTION INTRAVENOUS at 06:47

## 2021-10-26 RX ADMIN — IPRATROPIUM BROMIDE AND ALBUTEROL SULFATE 3 ML: .5; 3 SOLUTION RESPIRATORY (INHALATION) at 05:29

## 2021-10-26 RX ADMIN — KETOROLAC TROMETHAMINE 15 MG: 30 INJECTION, SOLUTION INTRAMUSCULAR at 05:04

## 2021-10-26 RX ADMIN — CEFTRIAXONE 1 G: 1 INJECTION, POWDER, FOR SOLUTION INTRAMUSCULAR; INTRAVENOUS at 06:47

## 2021-10-26 RX ADMIN — BUTALBITAL, ACETAMINOPHEN, AND CAFFEINE 2 TABLET: 50; 325; 40 TABLET ORAL at 05:01

## 2021-10-26 NOTE — ED NOTES
Report given to [facility] and preceptor that pt is to receive 500ml NSS and then discharge to home per Dr. Alexus Victoria.

## 2021-10-26 NOTE — ED PROVIDER NOTES
EMERGENCY DEPARTMENT HISTORY AND PHYSICAL EXAM      Date: 10/26/2021  Patient Name: Concetta Ahumada    History of Presenting Illness     Chief Complaint   Patient presents with    Headache     pt presents via EMS w/ c/o 9/10 head pain    Generalized Body Aches     pt reports generalized body aches since yesterday        History Provided By: Patient    HPI: Concetta Ahumada, 40 y.o. female with PMHx significant for substance abuse, bipolar disorder, diabetes, hypertension, hepatitis C presents to the ED with chief complaint of severe frontal headache, and cough that is productive of yellow sputum for the past week. She reports generalized body aches as well. Patient also reports off-and-on fever to 100.5. Reports nausea but denies any vomiting or diarrhea. She does report some shortness of breath. Admits to smoking 1 pack/day of cigarettes and using cocaine and heroin. Last used cocaine at 9 PM and last used heroin at 9 AM yesterday. She has a history of IV drug use years ago but states that currently she snorts heroin and smokes cocaine. Does not know date of her last menstrual cycle. Has not had any Covid vaccines. PCP: Jake, MD [place]    No current facility-administered medications on file prior to encounter. Current Outpatient Medications on File Prior to Encounter   Medication Sig Dispense Refill    calcium-vitamin D (OSCAL 250) 250-125 mg-unit tablet Take 1 Tab by mouth daily for 30 days. Indications: hypocalcemia 30 Tab 0    prazosin (MINIPRESS) 2 mg capsule Take 1 Cap by mouth nightly for 30 days. Indications: CHRONIC PTSD WITH TRAUMA NIGHTMARES 30 Cap 0    sertraline (ZOLOFT) 50 mg tablet Take 1 Tab by mouth daily for 30 days. Indications: ANXIETY WITH DEPRESSION 30 Tab 0    traMADol (ULTRAM) 50 mg tablet Take 2 Tabs by mouth every six (6) hours as needed for up to 30 days. Max Daily Amount: 400 mg.  Indications: NEUROPATHIC PAIN, Pain 90 Tab 0    traZODone (DESYREL) 150 mg tablet Take 1 Tab by mouth nightly for 30 days. Indications: insomnia associated with depression 30 Tab 0    traZODone (DESYREL) 50 mg tablet Take 50 mg by mouth nightly.  aspirin 81 mg chewable tablet Take 81 mg by mouth daily. Past History     Past Medical History:  Past Medical History:   Diagnosis Date    Anxiety disorder     [facility]2017    B[facility] disorder ([facility]     Cancer ([facility] 06/2017    polycystic ovarian cancer    Depression     Diabetes ([facility]     Resolved after weight loss. Last a1c 4.8 in 2016    Hypertension     Ill-defined condition     cerebellum and pituitary tumor    Liver disease     Hepatitis C    Pituitary tumor     Polycystic ovarian syndrome     Psychiatric disorder     depression    Psychiatric disorder     bipolar    Psychiatric disorder     PTSD    Psychiatric disorder     anxiety    Psychiatric disorder     Substance abuse ([facility]     Suicidal thoughts        Past Surgical History:  Past Surgical History:   Procedure Laterality Date    HX DILATION AND CURETTAGE      HX TONSILLECTOMY         Family History:  Family History   Family history unknown: Yes       Social History:  Social History     Tobacco Use    Smoking status: Current Every Day Smoker     Packs/day: 0.25     Years: 20.00     Pack years: 5.00     Types: Cigarettes    Smokeless tobacco: Current User   Substance Use Topics    Alcohol use: Yes     Alcohol/week: 0.0 standard drinks     Comment: last drink in July 2017    Drug use: Yes     Types: Heroin, Cocaine     Comment: last use today       Allergies: Allergies   Allergen Reactions    Penicillins Other (comments)     \"i've been told this all my life\".  Bee Sting [Sting, Bee] Swelling         Review of Systems   Review of Systems   Constitutional: Positive for fatigue and fever. Negative for chills. HENT: Negative for congestion, ear pain, rhinorrhea, sinus pain and sore throat. Eyes: Negative for visual disturbance. Respiratory: Positive for cough and shortness of breath. Negative for chest tightness and wheezing. Cardiovascular: Negative for chest pain, palpitations and leg swelling. Gastrointestinal: Positive for nausea. Negative for abdominal pain, diarrhea and vomiting. Genitourinary: Negative for dysuria, flank pain and hematuria. Musculoskeletal: Positive for myalgias. Negative for back pain. Skin: Negative for rash and wound. Neurological: Positive for headaches. Negative for dizziness, syncope and light-headedness. Psychiatric/Behavioral: Negative for confusion. The patient is nervous/anxious. All other systems reviewed and are negative.         Physical Exam   General appearance -thin, well appearing, and in no distress  Eyes - pupils equal and reactive, extraocular eye movements intact  ENT - mucous membranes moist, pharynx normal without lesions  Neck - supple, no significant adenopathy; non-tender to palpation  Chest -expiratory wheezes bilaterally i; non-tender to palpation  Heart - normal rate and regular rhythm, S1 and S2 normal, no murmurs noted  Abdomen - soft, nontender, nondistended, no masses or organomegaly  Musculoskeletal - no joint tenderness, deformity or swelling; normal ROM  Extremities - peripheral pulses normal, no pedal edema  Skin - normal coloration and turgor, no rashes  Neurological - alert, oriented x3, normal speech, no focal findings or movement disorder noted    Diagnostic Study Results     Labs -     Recent Results (from the past 72 hour(s))   CBC WITH AUTOMATED DIFF    Collection Time: 10/26/21  4:17 AM   Result Value Ref Range    WBC 11.4 (H) 3.6 - 11.0 K/uL    RBC 4.37 3.80 - 5.20 M/uL    HGB 13.1 11.5 - 16.0 g/dL    HCT 39.5 35.0 - 47.0 %    MCV 90.4 80.0 - 99.0 FL    MCH 30.0 26.0 - 34.0 PG    MCHC 33.2 30.0 - 36.5 g/dL    RDW 12.5 11.5 - 14.5 %    PLATELET 735 054 - 422 K/uL    MPV 9.9 8.9 - 12.9 FL    NRBC 0.0 0  WBC    ABSOLUTE NRBC 0.00 0.00 - 0.01 K/uL NEUTROPHILS 76 (H) 32 - 75 %    LYMPHOCYTES 17 12 - 49 %    MONOCYTES 6 5 - 13 %    EOSINOPHILS 1 0 - 7 %    BASOPHILS 0 0 - 1 %    IMMATURE GRANULOCYTES 0 0.0 - 0.5 %    ABS. NEUTROPHILS 8.7 (H) 1.8 - 8.0 K/UL    ABS. LYMPHOCYTES 1.9 0.8 - 3.5 K/UL    ABS. MONOCYTES 0.6 0.0 - 1.0 K/UL    ABS. EOSINOPHILS 0.1 0.0 - 0.4 K/UL    ABS. BASOPHILS 0.1 0.0 - 0.1 K/UL    ABS. IMM. GRANS. 0.0 0.00 - 0.04 K/UL    DF AUTOMATED     METABOLIC PANEL, COMPREHENSIVE    Collection Time: 10/26/21  4:17 AM   Result Value Ref Range    Sodium 136 136 - 145 mmol/L    Potassium 3.4 (L) 3.5 - 5.1 mmol/L    Chloride 104 97 - 108 mmol/L    CO2 27 21 - 32 mmol/L    Anion gap 5 5 - 15 mmol/L    Glucose 156 (H) 65 - 100 mg/dL    BUN 6 6 - 20 MG/DL    Creatinine 0.78 0.55 - 1.02 MG/DL    BUN/Creatinine ratio 8 (L) 12 - 20      GFR est AA >60 >60 ml/min/1.73m2    GFR est non-AA >60 >60 ml/min/1.73m2    Calcium 9.5 8.5 - 10.1 MG/DL    Bilirubin, total 0.4 0.2 - 1.0 MG/DL    ALT (SGPT) 15 12 - 78 U/L    AST (SGOT) 14 (L) 15 - 37 U/L    Alk. phosphatase 82 45 - 117 U/L    Protein, total 7.6 6.4 - 8.2 g/dL    Albumin 3.0 (L) 3.5 - 5.0 g/dL    Globulin 4.6 (H) 2.0 - 4.0 g/dL    A-G Ratio 0.7 (L) 1.1 - 2.2     SAMPLES BEING HELD    Collection Time: 10/26/21  4:17 AM   Result Value Ref Range    SAMPLES BEING HELD  PST     COMMENT        Add-on orders for these samples will be processed based on acceptable specimen integrity and analyte stability, which may vary by analyte.    URINALYSIS W/ REFLEX CULTURE    Collection Time: 10/26/21  5:02 AM    Specimen: Urine   Result Value Ref Range    Color YELLOW/STRAW      Appearance CLOUDY (A) CLEAR      Specific gravity 1.015 1.003 - 1.030      pH (UA) 5.5 5.0 - 8.0      Protein Negative NEG mg/dL    Glucose Negative NEG mg/dL    Ketone TRACE (A) NEG mg/dL    Bilirubin Negative NEG      Blood TRACE (A) NEG      Urobilinogen 0.2 0.2 - 1.0 EU/dL    Nitrites Positive (A) NEG      Leukocyte Esterase MODERATE (A) NEG      WBC 10-20 0 - 4 /hpf    RBC 0-5 0 - 5 /hpf    Epithelial cells FEW FEW /lpf    Bacteria 4+ (A) NEG /hpf    UA:UC IF INDICATED URINE CULTURE ORDERED (A) CNI     CULTURE, URINE    Collection Time: 10/26/21  5:02 AM    Specimen: Urine   Result Value Ref Range    Special Requests: NO SPECIAL REQUESTS  Reflexed from [id number]        [place] Count >100,000  COLONIES/mL        Culture result: GRAM NEGATIVE RODS (A)     SARS-COV-2    Collection Time: 10/26/21  5:03 AM   Result Value Ref Range    SARS-CoV-2 Please find results under separate order     DRUG SCREEN, URINE    Collection Time: 10/26/21  5:03 AM   Result Value Ref Range    AMPHETAMINES Negative NEG      BARBITURATES Negative NEG      BENZODIAZEPINES Negative NEG      COCAINE Positive (A) NEG      METHADONE Negative NEG      OPIATES Positive (A) NEG      PCP(PHENCYCLIDINE) Negative NEG      THC (TH-CANNABINOL) Negative NEG      Drug screen comment (NOTE)    SARS-COV-2    Collection Time: 10/26/21  5:03 AM   Result Value Ref Range    Specimen source Nasopharyngeal      SARS-CoV-2 Not detected NOTD     HCG URINE, QL. - POC    Collection Time: 10/26/21  5:12 AM   Result Value Ref Range    Pregnancy test,urine (POC) Negative NEG         Radiologic Studies -   XR CHEST PORT   Final Result   No acute cardiopulmonary process        CT Results  (Last 48 hours)    None        CXR Results  (Last 48 hours)    None            Medical Decision Making   I am the first provider for this patient. I reviewed the vital signs, available nursing notes, past medical history, past surgical history, family history and social history. Vital Signs-Reviewed the patient's vital signs.   Patient Vitals for the past 12 hrs:   Temp Pulse Resp BP SpO2   10/26/21 0221 97.7 °F (36.5 °C) 80 18 (!) 145/86 96 %         Records Reviewed: Nursing Notes and Old Medical Records    Provider Notes (Medical Decision Making):   Differential diagnosis: Pneumonia, bronchitis, asthma, Covid infection, viral syndrome  We will check CBC, CMP, Covid swab, chest x-ray. Will treat with albuterol    ED Course:   Initial assessment performed. The patients presenting problems have been discussed, and they are in agreement with the care plan formulated and outlined with them. I have encouraged them to ask questions as they arise throughout their visit. Progress Notes:   7:30 AM  Patient is feeling better after meds in the ED and fluids. Lab work shows mild leukocytosis with 10-20 white blood cells on UA, moderate leuk esterase, positive nitrites and 4+ bacteria. Treated with Rocephin in the ED. Will treat with antibiotics at discharge. Disposition:  Discharge home    PLAN:  1. Discharge Medication List as of 10/26/2021  6:38 AM      START taking these medications    Details   butalbital-acetaminophen-caff (Fioricet) -40 mg per capsule Take 1 Capsule by mouth every six (6) hours as needed for Headache., Normal, Disp-12 Capsule, R-0      albuterol (PROVENTIL HFA, VENTOLIN HFA, PROAIR HFA) 90 mcg/actuation inhaler Take 2 Puffs by inhalation every four (4) hours as needed for Wheezing., Normal, Disp-1 Each, R-0      cephALEXin (Keflex) 500 mg capsule Take 1 Capsule by mouth three (3) times daily for 7 days. , Normal, Disp-21 Capsule, R-0         CONTINUE these medications which have NOT CHANGED    Details   calcium-vitamin D (OSCAL 250) 250-125 mg-unit tablet Take 1 Tab by mouth daily for 30 days. Indications: hypocalcemia, Print, Disp-30 Tab, R-0      prazosin (MINIPRESS) 2 mg capsule Take 1 Cap by mouth nightly for 30 days. Indications: CHRONIC PTSD WITH TRAUMA NIGHTMARES, Print, Disp-30 Cap, R-0      sertraline (ZOLOFT) 50 mg tablet Take 1 Tab by mouth daily for 30 days. Indications: ANXIETY WITH DEPRESSION, Print, Disp-30 Tab, R-0      traMADol (ULTRAM) 50 mg tablet Take 2 Tabs by mouth every six (6) hours as needed for up to 30 days. Max Daily Amount: 400 mg.  Indications: NEUROPATHIC PAIN, Pain, Print, Disp-90 Tab, R-0      traZODone (DESYREL) 50 mg tablet Take 50 mg by mouth nightly., Historical Med      aspirin 81 mg chewable tablet Take 81 mg by mouth daily. , Historical Med           2. Follow-up Information     Follow up With Specialties Details Why Contact Info    [facility] EMERGENCY DEPT Emergency Medicine  If symptoms worsen [address]  [address]   [address]  [phone]        Return to ED if worse     Diagnosis     Clinical Impression:   1. Urinary tract infection without hematuria, site unspecified    2. Nonintractable headache, unspecified chronicity pattern, unspecified headache type    3. Acute bronchitis, unspecified organism    4.  Substance abuse ([facility]

## 2021-10-27 ENCOUNTER — PATIENT OUTREACH (OUTPATIENT)
Dept: CASE MANAGEMENT | Age: 46
End: 2021-10-27

## 2021-10-27 LAB
SARS-COV-2, XPLCVT: NOT DETECTED
SOURCE, COVRS: NORMAL

## 2021-10-27 NOTE — PROGRESS NOTES
10/27/2021  3:28 PM    Patient contacted regarding COVID-19 risk. Discussed COVID-19 related testing which was available at this time. Test results were negative. Patient informed of results, if available? no.       Patient outreach attempt by this AC today to perform initial post-discharge assessment; lvm requesting a return phone call to this ACM.

## 2021-10-28 LAB
BACTERIA SPEC CULT: ABNORMAL
CC UR VC: ABNORMAL
SERVICE CMNT-IMP: ABNORMAL

## 2021-10-28 NOTE — PROGRESS NOTES
10/28/2021  1:51 PM    Second patient outreach attempt by this ACM to perform initial post-discharge assessment; unable to reach patient-wrong number. MyChart communication not available due to patient's MyChart account is not activated. COVID Care Transitions episode resolved at this time due to ACM inability to reach patient.